# Patient Record
Sex: MALE | Race: WHITE | Employment: FULL TIME | URBAN - METROPOLITAN AREA
[De-identification: names, ages, dates, MRNs, and addresses within clinical notes are randomized per-mention and may not be internally consistent; named-entity substitution may affect disease eponyms.]

---

## 2018-07-12 ENCOUNTER — OFFICE VISIT (OUTPATIENT)
Dept: URGENT CARE | Age: 28
End: 2018-07-12

## 2018-07-12 VITALS
WEIGHT: 176 LBS | SYSTOLIC BLOOD PRESSURE: 136 MMHG | DIASTOLIC BLOOD PRESSURE: 69 MMHG | RESPIRATION RATE: 16 BRPM | HEIGHT: 71 IN | OXYGEN SATURATION: 98 % | BODY MASS INDEX: 24.64 KG/M2 | HEART RATE: 111 BPM | TEMPERATURE: 98.9 F

## 2018-07-12 DIAGNOSIS — J02.9 SORE THROAT: ICD-10-CM

## 2018-07-12 DIAGNOSIS — J02.0 ACUTE STREPTOCOCCAL PHARYNGITIS: Primary | ICD-10-CM

## 2018-07-12 LAB
S PYO AG THROAT QL: POSITIVE
VALID INTERNAL CONTROL?: YES

## 2018-07-12 RX ORDER — AMOXICILLIN 500 MG/1
500 CAPSULE ORAL 2 TIMES DAILY
Qty: 20 CAP | Refills: 0 | Status: SHIPPED | OUTPATIENT
Start: 2018-07-12 | End: 2018-07-22

## 2018-07-12 NOTE — MR AVS SNAPSHOT
[facility] [address] 
[phone] Patient: Blayne Ruth MRN: [id number] FRANCIS:[birth year] Visit Information Date & Time Provider Department Dept. Phone Encounter #  
 7/12/2018  7:45 PM [address] [facility] [phone] [id number] Upcoming Health Maintenance Date Due DTaP/Tdap/Td series (1 - Tdap) 3/24/2013 Influenza Age 5 to Adult 8/1/2018 Allergies as of 7/12/2018  Review Complete On: 7/12/2018 By: Ledora Dandy, RN No Known Allergies Current Immunizations  Never Reviewed No immunizations on file. Not reviewed this visit You Were Diagnosed With   
  
 Codes Comments Acute streptococcal pharyngitis    -  Primary ICD-10-CM: J02.0 ICD-9-CM: 034.0 Sore throat     ICD-10-CM: J02.9 ICD-9-CM: 956 Vitals BP Pulse Temp Resp Height(growth percentile) Weight(growth percentile) 136/69 (!) 111 98.9 °F (37.2 °C) 16 5' 11\" (1.803 m) 176 lb (79.8 kg) SpO2 BMI Smoking Status 98% 24.55 kg/m2 Never Smoker BMI and BSA Data Body Mass Index Body Surface Area 24.55 kg/m 2 2 m 2 Preferred Pharmacy [facility] [address] AT [address] [place] [address] Drive [phone] Your Updated Medication List  
  
   
This list is accurate as of 7/12/18  8:33 PM.  Always use your most recent med list.  
  
  
  
  
 amoxicillin 500 mg capsule Commonly known as:  AMOXIL Take 1 Cap by mouth two (2) times a day for 10 days. Prescriptions Sent to Pharmacy Refills  
 amoxicillin (AMOXIL) 500 mg capsule 0 Sig: Take 1 Cap by mouth two (2) times a day for 10 days. Class: Normal  
 [facility] [address] AT [address] [place] [address] Drive P Ph #: [phone] Route: Oral  
  
We Performed the Following AMB POC RAPID STREP A [51658 CPT(R)] Patient Instructions Follow up with PCP without improvement over next 3 days sooner/immediately for new or worsening Strep Throat: Care Instructions Your Care Instructions Strep throat is a bacterial infection that causes sudden, severe sore throat and fever. Strep throat, which is caused by bacteria called streptococcus, is treated with antibiotics. Sometimes a strep test is necessary to tell if the sore throat is caused by strep bacteria. Treatment can help ease symptoms and may prevent future problems. Follow-up care is a key part of your treatment and safety. Be sure to make and go to all appointments, and call your doctor if you are having problems. It's also a good idea to know your test results and keep a list of the medicines you take. How can you care for yourself at home? · Take your antibiotics as directed. Do not stop taking them just because you feel better. You need to take the full course of antibiotics. · Strep throat can spread to others until 24 hours after you begin taking antibiotics. During this time, you should avoid contact with other people at work or home, especially infants and children. Do not sneeze or cough on others, and wash your hands often. Keep your drinking glass and eating utensils separate from those of others, and wash these items well in hot, soapy water. · Gargle with warm salt water at least once each hour to help reduce swelling and make your throat feel better. Use 1 teaspoon of salt mixed in 8 fluid ounces of warm water. · Take an over-the-counter pain medication, such as acetaminophen (Tylenol), ibuprofen (Advil, Motrin), or naproxen (Aleve). Read and follow all instructions on the label. · Try an over-the-counter anesthetic throat spray or throat lozenges, which may help relieve throat pain. · Drink plenty of fluids. Fluids may help soothe an irritated throat.  Hot fluids, such as tea or soup, may help your throat feel better. · Eat soft solids and drink plenty of clear liquids. Flavored ice pops, ice cream, scrambled eggs, sherbet, and gelatin dessert (such as Jell-O) may also soothe the throat. · Get lots of rest. 
· Do not smoke, and avoid secondhand smoke. If you need help quitting, talk to your doctor about stop-smoking programs and medicines. These can increase your chances of quitting for good. · Use a vaporizer or humidifier to add moisture to the air in your bedroom. Follow the directions for cleaning the machine. When should you call for help? Call your doctor now or seek immediate medical care if: 
  · You have a new or higher fever.  
  · You have a fever with a stiff neck or severe headache.  
  · You have new or worse trouble swallowing.  
  · Your sore throat gets much worse on one side.  
  · Your pain becomes much worse on one side of your throat.  
 Watch closely for changes in your health, and be sure to contact your doctor if: 
  · You are not getting better after 2 days (48 hours).  
  · You do not get better as expected. Where can you learn more? Go to http://annalisa-chela.info/. Enter K625 in the search box to learn more about \"Strep Throat: Care Instructions. \" Current as of: May 12, 2017 Content Version: 11.7 © 7500-5613 Healthwise, Incorporated. Care instructions adapted under license by [employer] (which disclaims liability or warranty for this information). If you have questions about a medical condition or this instruction, always ask your healthcare professional. [place] [address] any warranty or liability for your use of this information. Introducing [facility] & HEALTH SERVICES! [facility] introduces YouLicense patient portal. Now you can access parts of your medical record, email your doctor's office, and request medication refills online.    
 
1. In your internet browser, go to [email or web address] 2. Click on the First Time User? Click Here link in the Sign In box. You will see the New Member Sign Up page. 3. Enter your [employer] Access Code exactly as it appears below. You will not need to use this code after youve completed the sign-up process. If you do not sign up before the expiration date, you must request a new code. · [employer] Access Code: [email or web address] Expires: 10/10/2018  8:33 PM 
 
4. Enter the last four digits of your Social Security Number (xxxx) and Date of Birth (mm/dd/yyyy) as indicated and click Submit. You will be taken to the next sign-up page. 5. Create a [employer] ID. This will be your [employer] login ID and cannot be changed, so think of one that is secure and easy to remember. 6. Create a [employer] password. You can change your password at any time. 7. Enter your Password Reset Question and Answer. This can be used at a later time if you forget your password. 8. Enter your e-mail address. You will receive e-mail notification when new information is available in [address] 9. Click Sign Up. You can now view and download portions of your medical record. 10. Click the Download Summary menu link to download a portable copy of your medical information. If you have questions, please visit the Frequently Asked Questions section of the [employer] website. Remember, [employer] is NOT to be used for urgent needs. For medical emergencies, dial 911. Now available from your iPhone and Android! Please provide this summary of care documentation to your next provider. Your primary care clinician is listed as NONE. If you have any questions after today's visit, please call [phone].

## 2018-07-12 NOTE — LETTER
NOTIFICATION RETURN TO WORK / SCHOOL 
 
7/12/2018 8:33 PM 
 
Mr. Rosetta Francois [facility] [address] To Whom It May Concern: 
 
Rosetta Coker is currently under the care of [address]. He will return to work/school on: 07/14 or 07/15/2018 If there are questions or concerns please have the patient contact our office. Sincerely, [facility] PROVIDER Piedad Daigle NP

## 2018-07-13 NOTE — PATIENT INSTRUCTIONS
Follow up with PCP without improvement over next 3 days sooner/immediately for new or worsening       Strep Throat: Care Instructions  Your Care Instructions    Strep throat is a bacterial infection that causes sudden, severe sore throat and fever. Strep throat, which is caused by bacteria called streptococcus, is treated with antibiotics. Sometimes a strep test is necessary to tell if the sore throat is caused by strep bacteria. Treatment can help ease symptoms and may prevent future problems. Follow-up care is a key part of your treatment and safety. Be sure to make and go to all appointments, and call your doctor if you are having problems. It's also a good idea to know your test results and keep a list of the medicines you take. How can you care for yourself at home? · Take your antibiotics as directed. Do not stop taking them just because you feel better. You need to take the full course of antibiotics. · Strep throat can spread to others until 24 hours after you begin taking antibiotics. During this time, you should avoid contact with other people at work or home, especially infants and children. Do not sneeze or cough on others, and wash your hands often. Keep your drinking glass and eating utensils separate from those of others, and wash these items well in hot, soapy water. · Gargle with warm salt water at least once each hour to help reduce swelling and make your throat feel better. Use 1 teaspoon of salt mixed in 8 fluid ounces of warm water. · Take an over-the-counter pain medication, such as acetaminophen (Tylenol), ibuprofen (Advil, Motrin), or naproxen (Aleve). Read and follow all instructions on the label. · Try an over-the-counter anesthetic throat spray or throat lozenges, which may help relieve throat pain. · Drink plenty of fluids. Fluids may help soothe an irritated throat. Hot fluids, such as tea or soup, may help your throat feel better.   · Eat soft solids and drink plenty of clear liquids. Flavored ice pops, ice cream, scrambled eggs, sherbet, and gelatin dessert (such as Jell-O) may also soothe the throat. · Get lots of rest.  · Do not smoke, and avoid secondhand smoke. If you need help quitting, talk to your doctor about stop-smoking programs and medicines. These can increase your chances of quitting for good. · Use a vaporizer or humidifier to add moisture to the air in your bedroom. Follow the directions for cleaning the machine. When should you call for help? Call your doctor now or seek immediate medical care if:    · You have a new or higher fever.     · You have a fever with a stiff neck or severe headache.     · You have new or worse trouble swallowing.     · Your sore throat gets much worse on one side.     · Your pain becomes much worse on one side of your throat.    Watch closely for changes in your health, and be sure to contact your doctor if:    · You are not getting better after 2 days (48 hours).     · You do not get better as expected. Where can you learn more? Go to http://annalisa-chela.info/. Enter K625 in the search box to learn more about \"Strep Throat: Care Instructions. \"  Current as of: May 12, 2017  Content Version: 11.7  © 6338-0209 [employer], Incorporated. Care instructions adapted under license by [employer] (which disclaims liability or warranty for this information). If you have questions about a medical condition or this instruction, always ask your healthcare professional. [place] [address] any warranty or liability for your use of this information.

## 2018-07-16 NOTE — PROGRESS NOTES
HPI Comments:   Sore throat onset yesterday  5/10 worse with swallowing. Constant. Not relieved with OTC meds. Tolerating food and fluids. No other associated symptoms. Denies: rashes, severe headache, neck stiffness. Overall worsening. Patient is a 34 y.o. male presenting with cold symptoms. Cold Symptoms   Pertinent negatives include no shortness of breath. History reviewed. No pertinent past medical history. Past Surgical History:   Procedure Laterality Date    HX OTHER SURGICAL      mass removed from R side of face         Family History   Problem Relation Age of Onset    No Known Problems Mother     No Known Problems Father         Social History     Social History    Marital status: UNKNOWN     Spouse name: N/A    Number of children: N/A    Years of education: N/A     Occupational History    Not on file. Social History Main Topics    Smoking status: Never Smoker    Smokeless tobacco: Never Used    Alcohol use Not on file    Drug use: Not on file    Sexual activity: Not on file     Other Topics Concern    Not on file     Social History Narrative                ALLERGIES: Review of patient's allergies indicates no known allergies. Review of Systems   Respiratory: Negative for cough and shortness of breath. All other systems reviewed and are negative. Vitals:    07/12/18 2002   BP: 136/69   Pulse: (!) 111   Resp: 16   Temp: 98.9 °F (37.2 °C)   SpO2: 98%   Weight: 176 lb (79.8 kg)   Height: 5' 11\" (1.803 m)       Physical Exam   Constitutional: He is oriented to person, place, and time. Non toxic appearing    HENT:   Nose: Nose normal.   OP erythema and edema uvula midline. Mucus membranes moist. TMs normal   Eyes: Conjunctivae and EOM are normal. Pupils are equal, round, and reactive to light. Neck: Normal range of motion. Neck supple. Cardiovascular: Normal rate, regular rhythm and normal heart sounds. Exam reveals no gallop and no friction rub.     No murmur heard.  Pulmonary/Chest: Effort normal and breath sounds normal. No respiratory distress. He has no wheezes. He has no rales. Lymphadenopathy:     He has cervical adenopathy. Neurological: He is alert and oriented to person, place, and time. No cranial nerve deficit. Skin: Skin is warm and dry. No rash noted. Psychiatric: He has a normal mood and affect. His behavior is normal. Thought content normal.       MDM     Differential Diagnosis; Clinical Impression; Plan:       CLINICAL IMPRESSION:  (J02.0) Acute streptococcal pharyngitis  (primary encounter diagnosis)  (J02.9) Sore throat    Orders Placed This Encounter      AMB POC RAPID STREP A  RX      amoxicillin (AMOXIL) 500 mg capsule      Plan:    Rapid strep test result: positive  Please take antibiotic as prescribed  until completion. OTC Tylenol or Advil as needed for fever/throat pain/discomfort. Maintain adequate fluid intake. May try gargles or throat lozenges. Throw out toothbrush after being on antibiotic for 48 hours    Follow up in in clinic without some relief in symptoms after being on antibiotic for 48 hours. Follow up immediately for new or worsening symptoms.             Procedures

## 2018-11-26 ENCOUNTER — OFFICE VISIT (OUTPATIENT)
Dept: URGENT CARE | Age: 28
End: 2018-11-26

## 2018-11-26 VITALS
SYSTOLIC BLOOD PRESSURE: 138 MMHG | HEART RATE: 110 BPM | HEIGHT: 71 IN | BODY MASS INDEX: 26.6 KG/M2 | OXYGEN SATURATION: 100 % | TEMPERATURE: 100.3 F | RESPIRATION RATE: 16 BRPM | WEIGHT: 190 LBS | DIASTOLIC BLOOD PRESSURE: 96 MMHG

## 2018-11-26 DIAGNOSIS — J10.1 INFLUENZA A: Primary | ICD-10-CM

## 2018-11-26 LAB
FLUAV+FLUBV AG NOSE QL IA.RAPID: NEGATIVE POS/NEG
FLUAV+FLUBV AG NOSE QL IA.RAPID: POSITIVE POS/NEG
VALID INTERNAL CONTROL?: YES

## 2018-11-26 RX ORDER — OSELTAMIVIR PHOSPHATE 75 MG/1
75 CAPSULE ORAL 2 TIMES DAILY
Qty: 10 CAP | Refills: 0 | Status: SHIPPED | OUTPATIENT
Start: 2018-11-26 | End: 2018-12-01

## 2018-11-26 RX ORDER — BENZONATATE 200 MG/1
200 CAPSULE ORAL
Qty: 21 CAP | Refills: 0 | Status: SHIPPED | OUTPATIENT
Start: 2018-11-26 | End: 2018-12-03

## 2018-11-26 RX ORDER — ONDANSETRON 4 MG/1
4 TABLET, ORALLY DISINTEGRATING ORAL
Qty: 10 TAB | Refills: 0 | Status: SHIPPED | OUTPATIENT
Start: 2018-11-26

## 2018-11-27 NOTE — PROGRESS NOTES
Cold Symptoms   The history is provided by the patient. This is a new problem. The current episode started 2 days ago. The problem occurs constantly. The problem has not changed since onset. The cough is non-productive. There has been a fever of 101 - 101.9 F. Associated symptoms include chills, headaches, rhinorrhea, sore throat and myalgias. Pertinent negatives include no shortness of breath, no wheezing and no nausea. He has tried nothing for the symptoms. His past medical history is significant for bronchitis. History reviewed. No pertinent past medical history. Past Surgical History:   Procedure Laterality Date    HX OTHER SURGICAL      mass removed from R side of face         Family History   Problem Relation Age of Onset    No Known Problems Mother     No Known Problems Father         Social History     Socioeconomic History    Marital status: UNKNOWN     Spouse name: Not on file    Number of children: Not on file    Years of education: Not on file    Highest education level: Not on file   Social Needs    Financial resource strain: Not on file    Food insecurity - worry: Not on file    Food insecurity - inability: Not on file   [employer] needs - medical: Not on file   [employer] needs - non-medical: Not on file   Occupational History    Not on file   Tobacco Use    Smoking status: Never Smoker    Smokeless tobacco: Never Used   Substance and Sexual Activity    Alcohol use: Not on file    Drug use: Not on file    Sexual activity: Not on file   Other Topics Concern    Not on file   Social History Narrative    Not on file                ALLERGIES: Patient has no known allergies. Review of Systems   Constitutional: Positive for chills. HENT: Positive for rhinorrhea and sore throat. Respiratory: Negative for shortness of breath and wheezing. Gastrointestinal: Negative for nausea. Musculoskeletal: Positive for myalgias. Neurological: Positive for headaches. All other systems reviewed and are negative. Vitals:    11/26/18 2006   BP: (!) 138/96   Pulse: (!) 110   Resp: 16   Temp: 100.3 °F (37.9 °C)   SpO2: 100%   Weight: 190 lb (86.2 kg)   Height: 5' 11\" (1.803 m)       Physical Exam   Constitutional: No distress. HENT:   Right Ear: Tympanic membrane and ear canal normal.   Left Ear: Tympanic membrane and ear canal normal.   Nose: Nose normal.   Mouth/Throat: No oropharyngeal exudate, posterior oropharyngeal edema or posterior oropharyngeal erythema. Eyes: Conjunctivae are normal. Right eye exhibits no discharge. Left eye exhibits no discharge. Neck: Neck supple. Pulmonary/Chest: Effort normal and breath sounds normal. No respiratory distress. He has no wheezes. He has no rales. Lymphadenopathy:     He has no cervical adenopathy. Skin: No rash noted. Nursing note and vitals reviewed. MDM    Procedures      ICD-10-CM ICD-9-CM    1. Influenza A J10.1 487.1 AMB POC SAVAGE INFLUENZA A/B TEST   Fluids/ gargles  Claritin/ allegra   Tylenol cold-sinus - max strength 1-2 tab 4 times/ day    with Advil as needed        Medications Ordered Today   Medications    oseltamivir (TAMIFLU) 75 mg capsule     Sig: Take 1 Cap by mouth two (2) times a day for 5 days. Dispense:  10 Cap     Refill:  0    ondansetron (ZOFRAN ODT) 4 mg disintegrating tablet     Sig: Take 1 Tab by mouth every eight (8) hours as needed for Nausea. Dispense:  10 Tab     Refill:  0    benzonatate (TESSALON) 200 mg capsule     Sig: Take 1 Cap by mouth three (3) times daily as needed for Cough for up to 7 days. Dispense:  21 Cap     Refill:  0     Results for orders placed or performed in visit on 11/26/18   AMB POC SAVAGE INFLUENZA A/B TEST   Result Value Ref Range    VALID INTERNAL CONTROL POC Yes     Influenza A Ag POC Positive Negative Pos/Neg    Influenza B Ag POC Negative Negative Pos/Neg     The patients condition was discussed with the patient and they understand.   The patient is to follow up with primary care doctor. If signs and symptoms become worse the pt is to go to the ER. The patient is to take medications as prescribed.

## 2018-11-27 NOTE — PATIENT INSTRUCTIONS
Influenza (Flu): Care Instructions  Your Care Instructions    Influenza (flu) is an infection in the lungs and breathing passages. It is caused by the influenza virus. There are different strains, or types, of the flu virus from year to year. Unlike the common cold, the flu comes on suddenly and the symptoms, such as a cough, congestion, fever, chills, fatigue, aches, and pains, are more severe. These symptoms may last up to 10 days. Although the flu can make you feel very sick, it usually doesn't cause serious health problems. Home treatment is usually all you need for flu symptoms. But your doctor may prescribe antiviral medicine to prevent other health problems, such as pneumonia, from developing. Older people and those who have a long-term health condition, such as lung disease, are most at risk for having pneumonia or other health problems. Follow-up care is a key part of your treatment and safety. Be sure to make and go to all appointments, and call your doctor if you are having problems. It's also a good idea to know your test results and keep a list of the medicines you take. How can you care for yourself at home? · Get plenty of rest.  · Drink plenty of fluids, enough so that your urine is light yellow or clear like water. If you have kidney, heart, or liver disease and have to limit fluids, talk with your doctor before you increase the amount of fluids you drink. · Take an over-the-counter pain medicine if needed, such as acetaminophen (Tylenol), ibuprofen (Advil, Motrin), or naproxen (Aleve), to relieve fever, headache, and muscle aches. Read and follow all instructions on the label. No one younger than 20 should take aspirin. It has been linked to Reye syndrome, a serious illness. · Do not smoke. Smoking can make the flu worse. If you need help quitting, talk to your doctor about stop-smoking programs and medicines. These can increase your chances of quitting for good.   · Breathe moist air from a hot shower or from a sink filled with hot water to help clear a stuffy nose. · Before you use cough and cold medicines, check the label. These medicines may not be safe for young children or for people with certain health problems. · If the skin around your nose and lips becomes sore, put some petroleum jelly on the area. · To ease coughing:  ? Drink fluids to soothe a scratchy throat. ? Suck on cough drops or plain hard candy. ? Take an over-the-counter cough medicine that contains dextromethorphan to help you get some sleep. Read and follow all instructions on the label. ? Raise your head at night with an extra pillow. This may help you rest if coughing keeps you awake. · Take any prescribed medicine exactly as directed. Call your doctor if you think you are having a problem with your medicine. To avoid spreading the flu  · Wash your hands regularly, and keep your hands away from your face. · Stay home from school, work, and other public places until you are feeling better and your fever has been gone for at least 24 hours. The fever needs to have gone away on its own without the help of medicine. · Ask people living with you to talk to their doctors about preventing the flu. They may get antiviral medicine to keep from getting the flu from you. · To prevent the flu in the future, get a flu vaccine every fall. Encourage people living with you to get the vaccine. · Cover your mouth when you cough or sneeze. When should you call for help? Call 911 anytime you think you may need emergency care.  For example, call if:    · You have severe trouble breathing.    Call your doctor now or seek immediate medical care if:    · You have new or worse trouble breathing.     · You seem to be getting much sicker.     · You feel very sleepy or confused.     · You have a new or higher fever.     · You get a new rash.    Watch closely for changes in your health, and be sure to contact your doctor if:    · You begin to get better and then get worse.     · You are not getting better after 1 week. Where can you learn more? Go to http://annalisa-chela.info/. Enter T446 in the search box to learn more about \"Influenza (Flu): Care Instructions. \"  Current as of: December 6, 2017  Content Version: 11.8  © 3079-6302 [employer]. Care instructions adapted under license by [employer] (which disclaims liability or warranty for this information). If you have questions about a medical condition or this instruction, always ask your healthcare professional. [place] [address] any warranty or liability for your use of this information.